# Patient Record
Sex: FEMALE | Race: ASIAN | NOT HISPANIC OR LATINO | Employment: FULL TIME | URBAN - NONMETROPOLITAN AREA
[De-identification: names, ages, dates, MRNs, and addresses within clinical notes are randomized per-mention and may not be internally consistent; named-entity substitution may affect disease eponyms.]

---

## 2018-12-14 ENCOUNTER — HOSPITAL ENCOUNTER (OUTPATIENT)
Dept: LAB | Facility: MEDICAL CENTER | Age: 28
End: 2018-12-14
Payer: COMMERCIAL

## 2018-12-14 PROCEDURE — 84439 ASSAY OF FREE THYROXINE: CPT

## 2018-12-14 PROCEDURE — 84443 ASSAY THYROID STIM HORMONE: CPT

## 2018-12-14 PROCEDURE — 83036 HEMOGLOBIN GLYCOSYLATED A1C: CPT

## 2018-12-14 PROCEDURE — 36415 COLL VENOUS BLD VENIPUNCTURE: CPT

## 2018-12-15 LAB
EST. AVERAGE GLUCOSE BLD GHB EST-MCNC: 128 MG/DL
HBA1C MFR BLD: 6.1 % (ref 0–5.6)
T4 FREE SERPL-MCNC: 0.6 NG/DL (ref 0.53–1.43)
TSH SERPL DL<=0.005 MIU/L-ACNC: 1.41 UIU/ML (ref 0.38–5.33)

## 2019-01-16 ENCOUNTER — HOSPITAL ENCOUNTER (INPATIENT)
Facility: MEDICAL CENTER | Age: 29
LOS: 3 days | End: 2019-01-19
Attending: OBSTETRICS & GYNECOLOGY | Admitting: OBSTETRICS & GYNECOLOGY
Payer: COMMERCIAL

## 2019-01-16 LAB
BASOPHILS # BLD AUTO: 0.7 % (ref 0–1.8)
BASOPHILS # BLD: 0.07 K/UL (ref 0–0.12)
CRYSTALS AMN MICRO: NORMAL
EOSINOPHIL # BLD AUTO: 0.21 K/UL (ref 0–0.51)
EOSINOPHIL NFR BLD: 2 % (ref 0–6.9)
ERYTHROCYTE [DISTWIDTH] IN BLOOD BY AUTOMATED COUNT: 45.1 FL (ref 35.9–50)
GLUCOSE BLD-MCNC: 90 MG/DL (ref 65–99)
HCT VFR BLD AUTO: 36.5 % (ref 37–47)
HGB BLD-MCNC: 12.5 G/DL (ref 12–16)
HOLDING TUBE BB 8507: NORMAL
IMM GRANULOCYTES # BLD AUTO: 0.08 K/UL (ref 0–0.11)
IMM GRANULOCYTES NFR BLD AUTO: 0.8 % (ref 0–0.9)
LYMPHOCYTES # BLD AUTO: 1.76 K/UL (ref 1–4.8)
LYMPHOCYTES NFR BLD: 16.6 % (ref 22–41)
MCH RBC QN AUTO: 31.9 PG (ref 27–33)
MCHC RBC AUTO-ENTMCNC: 34.2 G/DL (ref 33.6–35)
MCV RBC AUTO: 93.1 FL (ref 81.4–97.8)
MONOCYTES # BLD AUTO: 0.69 K/UL (ref 0–0.85)
MONOCYTES NFR BLD AUTO: 6.5 % (ref 0–13.4)
NEUTROPHILS # BLD AUTO: 7.8 K/UL (ref 2–7.15)
NEUTROPHILS NFR BLD: 73.4 % (ref 44–72)
NRBC # BLD AUTO: 0 K/UL
NRBC BLD-RTO: 0 /100 WBC
PLATELET # BLD AUTO: 326 K/UL (ref 164–446)
PMV BLD AUTO: 9.8 FL (ref 9–12.9)
RBC # BLD AUTO: 3.92 M/UL (ref 4.2–5.4)
WBC # BLD AUTO: 10.6 K/UL (ref 4.8–10.8)

## 2019-01-16 PROCEDURE — 3E033VJ INTRODUCTION OF OTHER HORMONE INTO PERIPHERAL VEIN, PERCUTANEOUS APPROACH: ICD-10-PCS | Performed by: OBSTETRICS & GYNECOLOGY

## 2019-01-16 PROCEDURE — 700111 HCHG RX REV CODE 636 W/ 250 OVERRIDE (IP): Performed by: OBSTETRICS & GYNECOLOGY

## 2019-01-16 PROCEDURE — 85025 COMPLETE CBC W/AUTO DIFF WBC: CPT

## 2019-01-16 PROCEDURE — 89060 EXAM SYNOVIAL FLUID CRYSTALS: CPT

## 2019-01-16 PROCEDURE — 82962 GLUCOSE BLOOD TEST: CPT

## 2019-01-16 PROCEDURE — 36415 COLL VENOUS BLD VENIPUNCTURE: CPT

## 2019-01-16 PROCEDURE — 770002 HCHG ROOM/CARE - OB PRIVATE (112)

## 2019-01-16 PROCEDURE — 700105 HCHG RX REV CODE 258: Performed by: OBSTETRICS & GYNECOLOGY

## 2019-01-16 RX ORDER — MISOPROSTOL 200 UG/1
800 TABLET ORAL
Status: DISCONTINUED | OUTPATIENT
Start: 2019-01-16 | End: 2019-01-17 | Stop reason: HOSPADM

## 2019-01-16 RX ORDER — SODIUM CHLORIDE, SODIUM LACTATE, POTASSIUM CHLORIDE, CALCIUM CHLORIDE 600; 310; 30; 20 MG/100ML; MG/100ML; MG/100ML; MG/100ML
INJECTION, SOLUTION INTRAVENOUS CONTINUOUS
Status: DISCONTINUED | OUTPATIENT
Start: 2019-01-16 | End: 2019-01-19 | Stop reason: HOSPADM

## 2019-01-16 RX ADMIN — SODIUM CHLORIDE, POTASSIUM CHLORIDE, SODIUM LACTATE AND CALCIUM CHLORIDE: 600; 310; 30; 20 INJECTION, SOLUTION INTRAVENOUS at 17:40

## 2019-01-16 RX ADMIN — Medication 1 MILLI-UNITS/MIN: at 17:40

## 2019-01-16 ASSESSMENT — LIFESTYLE VARIABLES
EVER_SMOKED: YES
ALCOHOL_USE: NO

## 2019-01-16 ASSESSMENT — COPD QUESTIONNAIRES
HAVE YOU SMOKED AT LEAST 100 CIGARETTES IN YOUR ENTIRE LIFE: YES
IN THE PAST 12 MONTHS DO YOU DO LESS THAN YOU USED TO BECAUSE OF YOUR BREATHING PROBLEMS: DISAGREE/UNSURE
DURING THE PAST 4 WEEKS HOW MUCH DID YOU FEEL SHORT OF BREATH: NONE/LITTLE OF THE TIME
COPD SCREENING SCORE: 2
DO YOU EVER COUGH UP ANY MUCUS OR PHLEGM?: NO/ONLY WITH OCCASIONAL COLDS OR INFECTIONS

## 2019-01-16 ASSESSMENT — PATIENT HEALTH QUESTIONNAIRE - PHQ9
SUM OF ALL RESPONSES TO PHQ9 QUESTIONS 1 AND 2: 0
1. LITTLE INTEREST OR PLEASURE IN DOING THINGS: NOT AT ALL
2. FEELING DOWN, DEPRESSED, IRRITABLE, OR HOPELESS: NOT AT ALL

## 2019-01-16 ASSESSMENT — PAIN SCALES - GENERAL
PAINLEVEL_OUTOF10: 0
PAINLEVEL_OUTOF10: 0

## 2019-01-16 NOTE — PROGRESS NOTES
"1300- 29 yr old, , EDC 19, 38.2wks, arrived to L&D for possible SROM at 1030 clr fluid per pt. Pt states, \"fluid is coming out sporadically.\"  Pt denies VB or UC's. Pt reports \"lots\" of +FM.   US and toco on, VS/S.  1345- SVE 1-2/60/-2, forebag present, no fluid present, fern obtained.  Fermarielos present.  Message left for Dr. Blue.  -Orders for pt to be admitted and started on pitocin, per Dr. Blue.  - Report given to FCO Martínez RN.  "

## 2019-01-17 LAB
ERYTHROCYTE [DISTWIDTH] IN BLOOD BY AUTOMATED COUNT: 44.7 FL (ref 35.9–50)
GLUCOSE BLD-MCNC: 88 MG/DL (ref 65–99)
HCT VFR BLD AUTO: 33.4 % (ref 37–47)
HGB BLD-MCNC: 11.3 G/DL (ref 12–16)
MCH RBC QN AUTO: 31.4 PG (ref 27–33)
MCHC RBC AUTO-ENTMCNC: 33.8 G/DL (ref 33.6–35)
MCV RBC AUTO: 92.8 FL (ref 81.4–97.8)
PLATELET # BLD AUTO: 266 K/UL (ref 164–446)
PMV BLD AUTO: 9.3 FL (ref 9–12.9)
RBC # BLD AUTO: 3.6 M/UL (ref 4.2–5.4)
WBC # BLD AUTO: 21.8 K/UL (ref 4.8–10.8)

## 2019-01-17 PROCEDURE — 85027 COMPLETE CBC AUTOMATED: CPT

## 2019-01-17 PROCEDURE — 36415 COLL VENOUS BLD VENIPUNCTURE: CPT

## 2019-01-17 PROCEDURE — 304965 HCHG RECOVERY SERVICES

## 2019-01-17 PROCEDURE — 82962 GLUCOSE BLOOD TEST: CPT

## 2019-01-17 PROCEDURE — 303615 HCHG EPIDURAL/SPINAL ANESTHESIA FOR LABOR

## 2019-01-17 PROCEDURE — A9270 NON-COVERED ITEM OR SERVICE: HCPCS | Performed by: OBSTETRICS & GYNECOLOGY

## 2019-01-17 PROCEDURE — 700111 HCHG RX REV CODE 636 W/ 250 OVERRIDE (IP)

## 2019-01-17 PROCEDURE — 59409 OBSTETRICAL CARE: CPT

## 2019-01-17 PROCEDURE — 770002 HCHG ROOM/CARE - OB PRIVATE (112)

## 2019-01-17 PROCEDURE — 700111 HCHG RX REV CODE 636 W/ 250 OVERRIDE (IP): Performed by: OBSTETRICS & GYNECOLOGY

## 2019-01-17 PROCEDURE — 700105 HCHG RX REV CODE 258: Performed by: OBSTETRICS & GYNECOLOGY

## 2019-01-17 PROCEDURE — 700102 HCHG RX REV CODE 250 W/ 637 OVERRIDE(OP): Performed by: OBSTETRICS & GYNECOLOGY

## 2019-01-17 RX ORDER — ONDANSETRON 2 MG/ML
4 INJECTION INTRAMUSCULAR; INTRAVENOUS EVERY 6 HOURS PRN
Status: DISCONTINUED | OUTPATIENT
Start: 2019-01-17 | End: 2019-01-19 | Stop reason: HOSPADM

## 2019-01-17 RX ORDER — MISOPROSTOL 200 UG/1
600 TABLET ORAL
Status: DISCONTINUED | OUTPATIENT
Start: 2019-01-17 | End: 2019-01-19 | Stop reason: HOSPADM

## 2019-01-17 RX ORDER — OXYCODONE HYDROCHLORIDE AND ACETAMINOPHEN 5; 325 MG/1; MG/1
2 TABLET ORAL EVERY 4 HOURS PRN
Status: DISCONTINUED | OUTPATIENT
Start: 2019-01-17 | End: 2019-01-17

## 2019-01-17 RX ORDER — OXYCODONE HYDROCHLORIDE AND ACETAMINOPHEN 5; 325 MG/1; MG/1
1-2 TABLET ORAL EVERY 4 HOURS PRN
Status: DISCONTINUED | OUTPATIENT
Start: 2019-01-17 | End: 2019-01-19 | Stop reason: HOSPADM

## 2019-01-17 RX ORDER — ONDANSETRON 2 MG/ML
INJECTION INTRAMUSCULAR; INTRAVENOUS
Status: COMPLETED
Start: 2019-01-17 | End: 2019-01-17

## 2019-01-17 RX ORDER — SODIUM CHLORIDE, SODIUM LACTATE, POTASSIUM CHLORIDE, CALCIUM CHLORIDE 600; 310; 30; 20 MG/100ML; MG/100ML; MG/100ML; MG/100ML
INJECTION, SOLUTION INTRAVENOUS PRN
Status: DISCONTINUED | OUTPATIENT
Start: 2019-01-17 | End: 2019-01-19 | Stop reason: HOSPADM

## 2019-01-17 RX ORDER — IBUPROFEN 600 MG/1
600 TABLET ORAL EVERY 6 HOURS PRN
Status: DISCONTINUED | OUTPATIENT
Start: 2019-01-17 | End: 2019-01-19 | Stop reason: HOSPADM

## 2019-01-17 RX ORDER — METHYLERGONOVINE MALEATE 0.2 MG/ML
0.2 INJECTION INTRAVENOUS
Status: DISCONTINUED | OUTPATIENT
Start: 2019-01-17 | End: 2019-01-17

## 2019-01-17 RX ORDER — ONDANSETRON 4 MG/1
4 TABLET, ORALLY DISINTEGRATING ORAL EVERY 6 HOURS PRN
Status: DISCONTINUED | OUTPATIENT
Start: 2019-01-17 | End: 2019-01-19 | Stop reason: HOSPADM

## 2019-01-17 RX ORDER — LIDOCAINE HYDROCHLORIDE AND EPINEPHRINE 15; 5 MG/ML; UG/ML
INJECTION, SOLUTION EPIDURAL
Status: ACTIVE
Start: 2019-01-17 | End: 2019-01-17

## 2019-01-17 RX ORDER — IBUPROFEN 600 MG/1
600 TABLET ORAL EVERY 6 HOURS PRN
Status: DISCONTINUED | OUTPATIENT
Start: 2019-01-17 | End: 2019-01-17

## 2019-01-17 RX ORDER — SODIUM CHLORIDE, SODIUM LACTATE, POTASSIUM CHLORIDE, AND CALCIUM CHLORIDE .6; .31; .03; .02 G/100ML; G/100ML; G/100ML; G/100ML
1000 INJECTION, SOLUTION INTRAVENOUS
Status: DISCONTINUED | OUTPATIENT
Start: 2019-01-17 | End: 2019-01-17 | Stop reason: HOSPADM

## 2019-01-17 RX ORDER — ROPIVACAINE HYDROCHLORIDE 2 MG/ML
INJECTION, SOLUTION EPIDURAL; INFILTRATION; PERINEURAL CONTINUOUS
Status: DISCONTINUED | OUTPATIENT
Start: 2019-01-17 | End: 2019-01-19 | Stop reason: HOSPADM

## 2019-01-17 RX ORDER — OXYCODONE HYDROCHLORIDE AND ACETAMINOPHEN 5; 325 MG/1; MG/1
1 TABLET ORAL EVERY 4 HOURS PRN
Status: DISCONTINUED | OUTPATIENT
Start: 2019-01-17 | End: 2019-01-19 | Stop reason: HOSPADM

## 2019-01-17 RX ORDER — ROPIVACAINE HYDROCHLORIDE 2 MG/ML
INJECTION, SOLUTION EPIDURAL; INFILTRATION; PERINEURAL
Status: COMPLETED
Start: 2019-01-17 | End: 2019-01-17

## 2019-01-17 RX ORDER — VITAMIN A ACETATE, BETA CAROTENE, ASCORBIC ACID, CHOLECALCIFEROL, .ALPHA.-TOCOPHEROL ACETATE, DL-, THIAMINE MONONITRATE, RIBOFLAVIN, NIACINAMIDE, PYRIDOXINE HYDROCHLORIDE, FOLIC ACID, CYANOCOBALAMIN, CALCIUM CARBONATE, FERROUS FUMARATE, ZINC OXIDE, CUPRIC OXIDE 3080; 12; 120; 400; 1; 1.84; 3; 20; 22; 920; 25; 200; 27; 10; 2 [IU]/1; UG/1; MG/1; [IU]/1; MG/1; MG/1; MG/1; MG/1; MG/1; [IU]/1; MG/1; MG/1; MG/1; MG/1; MG/1
1 TABLET, FILM COATED ORAL EVERY MORNING
Status: DISCONTINUED | OUTPATIENT
Start: 2019-01-17 | End: 2019-01-19 | Stop reason: HOSPADM

## 2019-01-17 RX ORDER — SODIUM CHLORIDE, SODIUM LACTATE, POTASSIUM CHLORIDE, AND CALCIUM CHLORIDE .6; .31; .03; .02 G/100ML; G/100ML; G/100ML; G/100ML
250 INJECTION, SOLUTION INTRAVENOUS PRN
Status: DISCONTINUED | OUTPATIENT
Start: 2019-01-17 | End: 2019-01-17 | Stop reason: HOSPADM

## 2019-01-17 RX ORDER — BUPIVACAINE HYDROCHLORIDE 2.5 MG/ML
INJECTION, SOLUTION EPIDURAL; INFILTRATION; INTRACAUDAL
Status: ACTIVE
Start: 2019-01-17 | End: 2019-01-17

## 2019-01-17 RX ORDER — DOCUSATE SODIUM 100 MG/1
100 CAPSULE, LIQUID FILLED ORAL 2 TIMES DAILY PRN
Status: DISCONTINUED | OUTPATIENT
Start: 2019-01-17 | End: 2019-01-19 | Stop reason: HOSPADM

## 2019-01-17 RX ADMIN — ROPIVACAINE HYDROCHLORIDE 100 ML: 2 INJECTION, SOLUTION EPIDURAL; INFILTRATION at 03:31

## 2019-01-17 RX ADMIN — FENTANYL CITRATE 100 MCG: 50 INJECTION, SOLUTION INTRAMUSCULAR; INTRAVENOUS at 02:54

## 2019-01-17 RX ADMIN — ONDANSETRON 4 MG: 2 INJECTION INTRAMUSCULAR; INTRAVENOUS at 13:42

## 2019-01-17 RX ADMIN — Medication 125 ML/HR: at 14:08

## 2019-01-17 RX ADMIN — SODIUM CHLORIDE, POTASSIUM CHLORIDE, SODIUM LACTATE AND CALCIUM CHLORIDE: 600; 310; 30; 20 INJECTION, SOLUTION INTRAVENOUS at 11:10

## 2019-01-17 RX ADMIN — IBUPROFEN 600 MG: 600 TABLET, FILM COATED ORAL at 17:56

## 2019-01-17 RX ADMIN — Medication 2000 ML/HR: at 12:58

## 2019-01-17 RX ADMIN — SODIUM CHLORIDE, POTASSIUM CHLORIDE, SODIUM LACTATE AND CALCIUM CHLORIDE: 600; 310; 30; 20 INJECTION, SOLUTION INTRAVENOUS at 01:42

## 2019-01-17 RX ADMIN — FENTANYL CITRATE 100 MCG: 50 INJECTION, SOLUTION INTRAMUSCULAR; INTRAVENOUS at 01:45

## 2019-01-17 RX ADMIN — FENTANYL CITRATE 100 MCG: 50 INJECTION, SOLUTION INTRAMUSCULAR; INTRAVENOUS at 00:44

## 2019-01-17 RX ADMIN — Medication 1 TABLET: at 17:56

## 2019-01-17 RX ADMIN — SODIUM CHLORIDE, POTASSIUM CHLORIDE, SODIUM LACTATE AND CALCIUM CHLORIDE: 600; 310; 30; 20 INJECTION, SOLUTION INTRAVENOUS at 02:55

## 2019-01-17 RX ADMIN — ROPIVACAINE HYDROCHLORIDE 100 ML: 2 INJECTION, SOLUTION EPIDURAL; INFILTRATION; PERINEURAL at 03:31

## 2019-01-17 ASSESSMENT — PAIN SCALES - GENERAL
PAINLEVEL_OUTOF10: 0
PAINLEVEL_OUTOF10: 3
PAINLEVEL_OUTOF10: 1
PAINLEVEL_OUTOF10: 0

## 2019-01-17 NOTE — L&D DELIVERY NOTE
Delivery note    2019    Delivering physician: Dr. Brasher  Attending physician: Dr. Blue    Diagnoses:  1-term intrauterine pregnancy at 38-3/7 weeks  2- viable male Apgars 2/9      Procedure: Spontaneous vaginal delivery    Hospital course: Patient is a 29-year-old female  2 para 1 who presented on  at 38-2/7 weeks with spontaneous rupture of membranes.  She did not go in labor so she is begun on Pitocin.  She made slow progress.  When I checked her this morning at around 7:15 AM she was 7 cm.  She reached completion at around 12:30 PM.  She pushed over several contractions delivering a viable male over an intact perineum in OA presentation.  Baby delivered without complication, baby had good tone but refused to breathe so the cord was clamped and cut.  Baby was given to nurse respiratory was called and then baby rapidly perked up.    Placenta delivered spontaneously intact.  Exam of intrauterine cavity revealed no retained placenta.  Estimated blood loss was less than 100 cc.  There were no tears.

## 2019-01-17 NOTE — PROGRESS NOTES
1900-Report received from JOHANNY Hill RN. Pt resting comfortably in bed. POC discussed, questions answered. FOB at bedside. Pt denies needs at this time.   2013-Dr. Working at bedside, SVE 2/80/-2, IUPC placed  2025-FSBS 90  0000-SVE 3/80/-2  0320-Dr. Sanders at bedside, epidural placed  0515-SVE 6/90/-2  0700-Report given to Brynn INIGUEZ

## 2019-01-17 NOTE — H&P
DATE OF ADMISSION:  01/16/2019    IDENTIFICATION:  This is a 29-year-old G2, P1, EDC 01/28/2019.  She makes her   38 weeks and 2 days who is being admitted with ruptured membranes.    HISTORY OF PRESENT ILLNESS:  Patient has been followed by myself.  Pregnancy   was overall uncomplicated.  She does have gestational diabetes, this has been   well controlled on diet alone.  She was followed by the high risk pregnancy   center.  She just had an ultrasound 2 days ago which showed baby to be at the   50th percentile, weighing 7 pounds 2 ounces and vertex presentation.  Her   group B strep was negative.  She had a lapse in care from 32-37 weeks that she   went to [place] to visit her family.    Patient presented today complaining of leaking of fluid since 10:30 a.m.    Ruptured membranes was confirmed and she is feeling some irregular   contractions.    OBSTETRICAL HISTORY:  Previous vaginal delivery in 2009 at 37 weeks, baby   weighed 6 pounds 7 ounces.    GYNECOLOGIC HISTORY:  She has no history of abnormal Pap smears or STDs.    PAST MEDICAL HISTORY:  None.    PAST SURGICAL HISTORY:  None.    ALLERGIES:  She has no known drug allergies.    SOCIAL HISTORY:  She is .  She has been working for [employer].  She denies   use of tobacco, alcohol or drugs.    FAMILY HISTORY:  Significant for diabetes and high blood pressure in her   maternal grandfather.    PHYSICAL EXAMINATION:  VITAL SIGNS:  She is afebrile.  Blood pressures are within normal limits.  GENERAL:  She is overall comfortable, and cooperative.  ABDOMEN:  Soft.  Leopold's 7 pounds.  GENITOURINARY:  Sterile vaginal exam per the nurse is 1-2, 60% effaced, -2   station.  External fetal monitoring is category 1.  Tocometry reveals   contractions irregularly.    ASSESSMENT:  A 29-year-old G2, P1 at 38 weeks and 2 days, here with ruptured   membranes and group B streptococcus negative status, gestational diabetes that   has been well controlled.    PLAN:  Will be  to start Pitocin to get labor started.  She can have an   epidural, she desires and we will follow her and expect a vaginal delivery.    Dr. Kothari is aware and will take over for me this evening.       ____________________________________     MD DESTIN Cervantes / MARGOTH    DD:  01/16/2019 17:04:32  DT:  01/16/2019 17:37:17    D#:  [id number]  Job#:  [id number]

## 2019-01-17 NOTE — CARE PLAN
Problem: Safety  Goal: Will remain free from injury  Outcome: PROGRESSING AS EXPECTED  Pt family encouraged to report any spills, loose cords. IUPC and EFM are on pt to monitor baby. Pt understands to not get out of bed with Epidural in place. IV fluids running as ordered. Educated on use of call light. Call light available at bedside.     Problem: Infection  Goal: Will remain free from infection  Outcome: PROGRESSING AS EXPECTED  Pt has not shown any s/s of infection duration of shift. Pt has remained afebrile. Will continue to observe for s/s of infection.

## 2019-01-17 NOTE — PROGRESS NOTES
0700- Report received from Vanessa MILLER RN. POC discussed. Assumed care of pt at this time. VSS. Pt coping through pain.    0730- Dr. Brasher at bedside. POC discussed with pt. SVE . FS 88.  0820- Pt c/o feeling sudden pressure. SVE by RN . Pt educated to call out when feeling increase in pressure. Will continue to monitor.   0850- Dr. Brasher telephoned and updated on pt status. Strip reviewed by MD. Orders received to check pt, if not completely dilated to start amnioinfusion at 300ml Bolus of LR.   09- SVE by RN . Amnioinfusion started. Pt and FOB Eduary educated.   1230- Dr. Brasher at bedside for status update. SVE at this time. Pt is completely dilated. Instructions given to have pt in stirrups. FOB at bedside. Given instructions on pushing technique.   1255-  of viable male infant APGARS 2/9. Rapid response called on infant. See Delivery record.   1500- Pt up to restroom. Ambulated with steady gait. Voided. Pericare done at this time.   1540- Pt transferred to postpartum unit via wheelchair with infant in arms. Report given to Any valentino. POC discussed.

## 2019-01-17 NOTE — CARE PLAN
Problem: Pain  Goal: Alleviation of Pain or a reduction in pain to the patient's comfort goal  Outcome: PROGRESSING AS EXPECTED  Pt is comfortable and has no complaints at this time.    Problem: Risk for Infection, Impaired Wound Healing  Goal: Remain free from signs and symptoms of infection    Intervention: Limit vaginal exams as necessary  Vaginal exams limited as necessary to prevent infection.

## 2019-01-17 NOTE — PROGRESS NOTES
S: Doing well. Just had fentanyl      O:   Gen: NAD   SVE: 3cm per RN  [place]: CTX q3  FHT: 140 with moderate LTV. +accels. No variables/decels     A/P 30yo  @ 38w, PROM  1. Labor: remains latent. Continue pitocin  2. Cat I  3. Pain controlled. Plans epidural  4. GDMA1: will obtain random CBG

## 2019-01-17 NOTE — PROGRESS NOTES
"S: now comfy with epidural. Just tired.    O: Blood pressure 128/74, pulse 95, temperature 37.2 °C (98.9 °F), temperature source Temporal, resp. rate 16, height 1.6 m (5' 3\"), weight 79.4 kg (175 lb), SpO2 94 %.  Gen: NAD  SVE: per RN @ 0515 6cm   IUPC: CTX q3  FHT: 130 with moderate LTV. +accles. Occ varialbes    A/P 28yo  @ 38w, PROM  1. Labor: now active. Continue pitocin  2. Cat II  3. Pain controlled  4. GBS neg   "

## 2019-01-17 NOTE — CARE PLAN
Problem: Pain  Goal: Alleviation of Pain or a reduction in pain to the patient's comfort goal  Outcome: PROGRESSING AS EXPECTED  Pain POC discussed, pt denies pain at this time, will call out when pain medication intervention is necessary    Problem: Risk for Infection, Impaired Wound Healing  Goal: Remain free from signs and symptoms of infection  Outcome: PROGRESSING AS EXPECTED  Pt afebrile, no s/s of infection

## 2019-01-17 NOTE — PROGRESS NOTES
"S: doing well. Feeling some ctx. Planning an epidural    O: Blood pressure 120/76, pulse 74, temperature 37.2 °C (98.9 °F), temperature source Temporal, resp. rate 16, height 1.6 m (5' 3\"), weight 79.4 kg (175 lb).  Gen: NAD   SVE: 2//-3  IUPC placed  [place]: CTX q5  FHT: 140 with moderate LTV. +accels. No variables/decels    A/P 30yo  @ 38w, PROM  1. Labor: remains latent. Continue pitocin  2. Cat I  3. Pain controlled. Plans epidural  4. GDMA1: will obtain random CBG  "

## 2019-01-18 PROCEDURE — 700102 HCHG RX REV CODE 250 W/ 637 OVERRIDE(OP): Performed by: OBSTETRICS & GYNECOLOGY

## 2019-01-18 PROCEDURE — A9270 NON-COVERED ITEM OR SERVICE: HCPCS | Performed by: OBSTETRICS & GYNECOLOGY

## 2019-01-18 PROCEDURE — 770002 HCHG ROOM/CARE - OB PRIVATE (112)

## 2019-01-18 RX ADMIN — IBUPROFEN 600 MG: 600 TABLET, FILM COATED ORAL at 06:36

## 2019-01-18 RX ADMIN — IBUPROFEN 600 MG: 600 TABLET, FILM COATED ORAL at 19:19

## 2019-01-18 RX ADMIN — OXYCODONE AND ACETAMINOPHEN 2 TABLET: 5; 325 TABLET ORAL at 09:31

## 2019-01-18 RX ADMIN — OXYCODONE AND ACETAMINOPHEN 1 TABLET: 5; 325 TABLET ORAL at 19:19

## 2019-01-18 ASSESSMENT — PAIN SCALES - GENERAL
PAINLEVEL_OUTOF10: 2
PAINLEVEL_OUTOF10: 4
PAINLEVEL_OUTOF10: 2
PAINLEVEL_OUTOF10: 5

## 2019-01-18 NOTE — PROGRESS NOTES
1827- Dr. Mercado on the postpartum unit and notified of patient's blood pressure of 144/77 and notified that patient's fundus is firm and lochia is light.  Verbal order received to discontinue methergine.

## 2019-01-18 NOTE — CARE PLAN
Problem: Altered physiologic condition related to immediate post-delivery state and potential for bleeding/hemorrhage  Goal: Patient physiologically stable as evidenced by normal lochia, palpable uterine involution and vital signs within normal limits  Outcome: PROGRESSING SLOWER THAN EXPECTED  Fundus firm @ U, lochia rubra minimal. V/S stable @ this time.     Problem: Alteration in comfort related to episiotomy, vaginal repair and/or after birth pains  Goal: Patient verbalizes acceptable pain level  Outcome: PROGRESSING AS EXPECTED  Pain controlled @ this time. Will be medicated as needed.

## 2019-01-18 NOTE — LACTATION NOTE
Mother states she has been using personal Medela pump since baby is in NICU, states she exclusively pumped with personal pump for older child, encouraged to use HG pump for adequate breast stimulation, encouraged to pump Q 2-3 hours at least 8 times every 24 hours, [facility] and [facility] to  [place], encouraged to call for assistance as needed.

## 2019-01-18 NOTE — PROGRESS NOTES
Progress Note        Subjective: Patient is doing well lochia is normal.    Objective: Vital signs are normal  General: Patient's awake alert pleasant  Head: Atraumatic normocephalic  Abdomen: Fundus is firm nontender    Assessment:  Postpartum day 1 vaginal delivery    Plan:  DC tomorrow, routine care    Rhina Brasher MD

## 2019-01-18 NOTE — CARE PLAN
Problem: Communication  Goal: The ability to communicate needs accurately and effectively will improve  Outcome: PROGRESSING AS EXPECTED  Reviewed plan of care with patient who verbalized understanding.    Problem: Altered physiologic condition related to immediate post-delivery state and potential for bleeding/hemorrhage  Goal: Patient physiologically stable as evidenced by normal lochia, palpable uterine involution and vital signs within normal limits  Outcome: PROGRESSING AS EXPECTED  Fundus firm.  Lochia light.

## 2019-01-18 NOTE — CONSULTS
This LC came in to talk to MOB- she brought her own pump, and she states she will pump when she is ready. Declines help at this time.

## 2019-01-18 NOTE — PROGRESS NOTES
1900- bedside report done. Patient in bed socializing with visitors. Denies pain @ This time.   2030- Patient will use her own breast pump. Encourage to pump every 3 hrs., and patient verbalized understanding.

## 2019-01-18 NOTE — PROGRESS NOTES
1555- Patient arrived to Room S319.  Report received from HIRA Simeon.  Fundus firm.  Lochia light.  IV patent.  Patient up to bathroom and voided without difficulty.  Patient oriented to location of call light and emergency cord.  Patient denied need for pain medication at this time.  1630- Patient assessment done.  Discussed pain management with patient. Patient prefers to be call for pain medication as needed.  Patient oriented to room, call system, and infant security.  Reviewed plan of care.

## 2019-01-19 VITALS
HEIGHT: 63 IN | BODY MASS INDEX: 31.01 KG/M2 | WEIGHT: 175 LBS | HEART RATE: 73 BPM | TEMPERATURE: 98.3 F | RESPIRATION RATE: 18 BRPM | SYSTOLIC BLOOD PRESSURE: 122 MMHG | OXYGEN SATURATION: 99 % | DIASTOLIC BLOOD PRESSURE: 81 MMHG

## 2019-01-19 PROCEDURE — A9270 NON-COVERED ITEM OR SERVICE: HCPCS | Performed by: OBSTETRICS & GYNECOLOGY

## 2019-01-19 PROCEDURE — 700102 HCHG RX REV CODE 250 W/ 637 OVERRIDE(OP): Performed by: OBSTETRICS & GYNECOLOGY

## 2019-01-19 RX ORDER — IBUPROFEN 600 MG/1
600 TABLET ORAL EVERY 6 HOURS PRN
Qty: 30 TAB | Refills: 1 | Status: SHIPPED | OUTPATIENT
Start: 2019-01-19 | End: 2021-09-03

## 2019-01-19 RX ADMIN — Medication 1 TABLET: at 09:12

## 2019-01-19 RX ADMIN — OXYCODONE AND ACETAMINOPHEN 1 TABLET: 5; 325 TABLET ORAL at 05:14

## 2019-01-19 RX ADMIN — IBUPROFEN 600 MG: 600 TABLET, FILM COATED ORAL at 05:14

## 2019-01-19 RX ADMIN — IBUPROFEN 600 MG: 600 TABLET, FILM COATED ORAL at 12:48

## 2019-01-19 ASSESSMENT — EDINBURGH POSTNATAL DEPRESSION SCALE (EPDS)
I HAVE FELT SCARED OR PANICKY FOR NO GOOD REASON: NO, NOT AT ALL
I HAVE BEEN ABLE TO LAUGH AND SEE THE FUNNY SIDE OF THINGS: AS MUCH AS I ALWAYS COULD
I HAVE BEEN SO UNHAPPY THAT I HAVE BEEN CRYING: NO, NEVER
I HAVE BEEN ANXIOUS OR WORRIED FOR NO GOOD REASON: NO, NOT AT ALL
THINGS HAVE BEEN GETTING ON TOP OF ME: NO, I HAVE BEEN COPING AS WELL AS EVER
I HAVE BLAMED MYSELF UNNECESSARILY WHEN THINGS WENT WRONG: NO, NEVER
I HAVE LOOKED FORWARD WITH ENJOYMENT TO THINGS: AS MUCH AS I EVER DID
I HAVE FELT SAD OR MISERABLE: NO, NOT AT ALL
THE THOUGHT OF HARMING MYSELF HAS OCCURRED TO ME: NEVER
I HAVE BEEN SO UNHAPPY THAT I HAVE HAD DIFFICULTY SLEEPING: NOT AT ALL

## 2019-01-19 ASSESSMENT — PAIN SCALES - GENERAL
PAINLEVEL_OUTOF10: 0
PAINLEVEL_OUTOF10: 0
PAINLEVEL_OUTOF10: 3
PAINLEVEL_OUTOF10: 3
PAINLEVEL_OUTOF10: 5

## 2019-01-19 NOTE — CARE PLAN
Problem: Altered physiologic condition related to immediate post-delivery state and potential for bleeding/hemorrhage  Goal: Patient physiologically stable as evidenced by normal lochia, palpable uterine involution and vital signs within normal limits  Fundus firm, lochia     Problem: Potential for postpartum infection related to presence of episiotomy/vaginal tear and/or uterine contamination  Goal: Patient will be absent from signs and symptoms of infection  No s/s of infection

## 2019-01-19 NOTE — DISCHARGE INSTRUCTIONS
POSTPARTUM DISCHARGE INSTRUCTIONS FOR MOM    YOB: 1990   Age: 29 y.o.               Admit Date: 2019     Discharge Date: 2019  Attending Doctor:  Saranya Blue M.D.                  Allergies:  Patient has no known allergies.    Discharged to home by car. Discharged via wheelchair, hospital escort: Yes.  Special equipment needed: Not Applicable  Belongings with: Personal  Be sure to schedule a follow-up appointment with your primary care doctor or any specialists as instructed.     Discharge Plan:   Diet Plan: Discussed  Activity Level: Discussed  Smoking Cessation Offered: Patient Counseled  Confirmed Follow up Appointment: Patient to Call and Schedule Appointment  Confirmed Symptoms Management: Discussed  Medication Reconciliation Updated: Yes  Influenza Vaccine Indication: Patient Refuses    REASONS TO CALL YOUR OBSTETRICIAN:  1.   Persistent fever or shaking chills (Temperature higher than 100.4)  2.   Heavy bleeding (soaking more than 1 pad per hour); Passing clots  3.   Foul odor from vagina  4.   Mastitis (Breast infection; breast pain, chills, fever, redness)  5.   Urinary pain, burning or frequency  6.   Episiotomy infection  7.   Abdominal incision infection  8.   Severe depression longer than 24 hours    HAND WASHING  · Prior to handling the baby.  · Before breastfeeding or bottle feeding baby.  · After using the bathroom or changing the baby's diaper.    WOUND CARE  Ask your physician for additional care instructions.  In general:    ·  Incision:      · Keep clean and dry.    · Do NOT lift anything heavier than your baby for up to 6 weeks.    · There should not be any opening or pus.      VAGINAL CARE  · Nothing inside vagina for 6 weeks: no sexual intercourse, tampons or douching.  · Bleeding may continue for 2-4 weeks.  Amount may vary.    · Call your physician for heavy bleeding which means soaking more than 1 pad per hour    BIRTH CONTROL  · It is possible to become  "pregnant at any time after delivery and while breastfeeding.  · Plan to discuss a method of birth control with your physician at your follow up visit. visit.    DIET AND ELIMINATION  · Eating more fiber (bran cereal, fruits, and vegetables) and drinking plenty of fluids will help to avoid constipation.  · Urinary frequency after childbirth is normal.    POSTPARTUM BLUES  During the first few days after birth, you may experience a sense of the \"blues\" which may include impatience, irritability or even crying.  These feeling come and go quickly.  However, as many as 1 in 10 women experience emotional symptoms known as postpartum depression.    Postpartum depression:  May start as early as the second or third day after delivery or take several weeks or months to develop.  Symptoms of \"blues\" are present, but are more intense:  Crying spells; loss of appetite; feelings of hopelessness or loss of control; fear of touching the baby; over concern or no concern at all about the baby; little or no concern about your own appearance/caring for yourself; and/or inability to sleep or excessive sleeping.  Contact your physician if you are experiencing any of these symptoms.    Crisis Hotline:  · [place] Crisis Hotline:  4-667-UYJUGPO  Or 1-479.332.3355  · [place] Crisis Hotline:  1-219.922.4434  Or 733-813-1310    PREVENTING SHAKEN BABY:  If you are angry or stressed, PUT THE BABY IN THE CRIB, step away, take some deep breaths, and wait until you are calm to care for the baby.  DO NOT SHAKE THE BABY.  You are not alone, call a supporter for help.    · Crisis Call Center 24/7 crisis line 080-214-8498 or 1-571.717.3730  · You can also text them, text \"ANSWER\" to 415075    QUIT SMOKING/TOBACCO USE:  I understand the use of any tobacco products increases my chance of suffering from future heart disease and could cause other illnesses which may shorten my life. Quitting the use of tobacco products is the single most important thing I " can do to improve my health. For further information on smoking / tobacco cessation call a Toll Free Quit Line at 1-373.548.7240 (*National Cancer [place]) or 1-807.312.5865 (American Lung Association) or you can access the web based program at www.lungusa.org.    · [place] Tobacco Users Help Line:  (457) 730-6136       Toll Free: 1-826.977.5292  · Quit Tobacco Program [facility] (661)558-9375    DEPRESSION / SUICIDE RISK:  As you are discharged from this [facility], it is important to learn how to keep safe from harming yourself.    Recognize the warning signs:  · Abrupt changes in personality, positive or negative- including increase in energy   · Giving away possessions  · Change in eating patterns- significant weight changes-  positive or negative  · Change in sleeping patterns- unable to sleep or sleeping all the time   · Unwillingness or inability to communicate  · Depression  · Unusual sadness, discouragement and loneliness  · Talk of wanting to die  · Neglect of personal appearance   · Rebelliousness- reckless behavior  · Withdrawal from people/activities they love  · Confusion- inability to concentrate     If you or a loved one observes any of these behaviors or has concerns about self-harm, here's what you can do:  · Talk about it- your feelings and reasons for harming yourself  · Remove any means that you might use to hurt yourself (examples: pills, rope, extension cords, firearm)  · Get professional help from the community (Mental Health, Substance Abuse, psychological counseling)  · Do not be alone:Call your Safe Contact- someone whom you trust who will be there for you.  · Call your local CRISIS HOTLINE 652-0884 or 054-359-2180  · Call your local Children's Mobile Crisis Response Team [place] (605) 916-6168 or www.Thinkature  · Call the toll free National Suicide Prevention Hotlines   · National Suicide Prevention Lifeline 079-413-BQLU (7127)  · National  [facility] 800-SUICIDE (581-5673)    DISCHARGE SURVEY:  Thank you for choosing [facility].  We hope we provided you with very good care.  You may be receiving a survey in the mail.  Please fill it out.  Your opinion is valuable to us.    ADDITIONAL EDUCATIONAL MATERIALS GIVEN TO PATIENT:        My signature on this form indicates that:  1.  I have reviewed and understand the above information  2.  My questions regarding this information have been answered to my satisfaction.  3.  I have formulated a plan with my discharge nurse to obtain my prescribed medication for home.

## 2019-01-19 NOTE — DISCHARGE PLANNING
Discharge Planning Assessment Post Partum    Reason for Referral: NICU  Address: [address] [place] [place] [address]  Type of Living Situation: House with FOB and other child  Mom Diagnosis: Pregnancy  Baby Diagnosis: NICU  Primary Language: English    Name of Baby: Luis  Mother of the Baby: Kristen Garg ([phone])  Father of the Baby: Hector Catherine  Involved in baby’s care? Yes  Contact Information: [phone]    Prenatal Care: Yes  Mom's PCP: None  PCP for new baby:Pediatrician list given to MOB    Support System: Yes  Coping/Bonding between mother & baby: Yes  Source of Feeding: Breast  Supplies for Infant: Prepared    Mom's Insurance: [employer]  Baby Covered on Insurance: FOB is working on adding baby to his insurance ([facility]  Mother Employed/School: MOB and FOB work for [employer].  Other children in the home/names & ages: Hozay-9    Financial Hardship/Income: No  Mom's Mental status: Alert and Oriented x 4  Services used prior to admit: None    CPS History: No  Psychiatric History: No  Domestic Violence History: No  Drug/ETOH History: No    Resources Provided: Children and Family Resource List, Pediatrician List, NICU Bootcamp Information, [employer] Information   Referrals Made: LSW informed MOB/FOB about the [employer]. MOB/FOB stated they do not want to utilize the house at this time. LSW informed them that they can contact LSW if they would like to in the future.      Clearance for Discharge: Baby is clear to discharge home with MOB/FOB upon medical clearance.     Ongoing Plan: Continue to provide support and resources to MOB/FOB until discharge.

## 2019-01-19 NOTE — PROGRESS NOTES
Progress Note    Kristen Garg   PP day 2  Chief Admitting Dx: Pregnancy  Indication for care in labor or delivery  Delivery Type: vaginal, spontaneous      Subjective:  Ambulating: voiding, chava diet, normal lochia, pain controlled. Baby still in nicu.  Vitals:    01/17/19 2000 01/18/19 0800 01/18/19 2015 01/19/19 0800   BP: 125/75 122/79 140/80 122/81   Pulse: 84 82 77 73   Resp: 18 18 19 18   Temp: 36.8 °C (98.3 °F) 36.6 °C (97.9 °F) 36.8 °C (98.2 °F) 36.8 °C (98.3 °F)   TempSrc: Temporal Temporal Temporal Temporal   SpO2: 90% 97% 99% 99%   Weight:       Height:           Exam:  Gen: no acute distress  Abdomen: soft nt/nd firm fundus  Ext: no calf pain niki LE    Labs:   No results found for this or any previous visit (from the past 24 hour(s)).    Assessment:  Ppd 2    Plan:  Discharge home  Encourage ambulation  Pelvic rest, no heavy lifting/pulling /pushing  F/u in my office  6 weeks postpartum  Continue prenatal vitamins daily  Give Rhogam if Rh negative and indicated  Give MMR if indcated prior to discharge  Encourage breastfeeding      Cathleen Mercado M.D.

## 2019-01-19 NOTE — DISCHARGE SUMMARY
DATE OF ADMISSION:  01/16/2019    DATE OF DISCHARGE:  01/19/2019    PRINCIPAL DIAGNOSES:  1.  Intrauterine pregnancy at 30 weeks gestation with prolonged rupture of   membranes.  2.  A1 diabetic.    PROCEDURE:  Normal spontaneous vaginal delivery.    HOSPITAL COURSE:  Patient arrived at 38 weeks with rupture of membranes.  She   is well controlled with her diabetes with diet alone.  She was started on   Pitocin.  Group B strep was negative.  She ended up having a vaginal delivery.    Dr. Brasher delivered her.  She delivered a male infant.  Baby had required   some resuscitation and went to the NICU.  Apgars given were 2 and 9 and weight   was 3120 g.  By postpartum day #2, she is ambulating, voiding, and tolerating   regular diet.  Pain is controlled with p.o. pain medication.  She will be   discharged home.  The baby remains in the NICU, but is stable.  She will   follow up with Dr. Blue in 6 weeks, sooner if needed.  Verbal instructions   given and all questions answered.  Prescriptions for Motrin written.  She   will resume prenatal vitamins.  Her postoperative hematocrit was 33.4.  Again,   she is to follow up with Dr. Blue in 6 weeks.       ____________________________________     MD MEERA DAVIS / MARGOTH    DD:  01/19/2019 08:45:57  DT:  01/19/2019 09:00:50    D#:  [id number]  Job#:  [id number]

## 2019-01-19 NOTE — PROGRESS NOTES
Discharge instruction discussed. Prescription given and explained. Questions and concerns have been answered.

## 2021-01-28 ENCOUNTER — OFFICE VISIT (OUTPATIENT)
Dept: URGENT CARE | Facility: PHYSICIAN GROUP | Age: 31
End: 2021-01-28
Payer: COMMERCIAL

## 2021-01-28 VITALS
WEIGHT: 147.2 LBS | HEIGHT: 63 IN | BODY MASS INDEX: 26.08 KG/M2 | SYSTOLIC BLOOD PRESSURE: 122 MMHG | HEART RATE: 85 BPM | RESPIRATION RATE: 16 BRPM | OXYGEN SATURATION: 98 % | TEMPERATURE: 98.5 F | DIASTOLIC BLOOD PRESSURE: 86 MMHG

## 2021-01-28 DIAGNOSIS — R19.7 DIARRHEA, UNSPECIFIED TYPE: ICD-10-CM

## 2021-01-28 PROCEDURE — 99202 OFFICE O/P NEW SF 15 MIN: CPT | Performed by: PHYSICIAN ASSISTANT

## 2021-01-28 ASSESSMENT — ENCOUNTER SYMPTOMS
BLOOD IN STOOL: 0
FEVER: 0
ABDOMINAL PAIN: 0
VOMITING: 0
CHILLS: 0
SHORTNESS OF BREATH: 0
NAUSEA: 0
DIARRHEA: 1

## 2021-01-28 NOTE — LETTER
January 28, 2021         Patient: Kristen Garg   YOB: 1990   Date of Visit: 1/28/2021           To Whom it May Concern:    Kristen Garg was seen in my clinic on 1/28/2021. Please excuse from work 01/22/21.    If you have any questions or concerns, please don't hesitate to call.        Sincerely,           Darnell Ulloa P.A.-C.  Electronically Signed

## 2021-09-03 ENCOUNTER — OFFICE VISIT (OUTPATIENT)
Dept: URGENT CARE | Facility: PHYSICIAN GROUP | Age: 31
End: 2021-09-03
Payer: COMMERCIAL

## 2021-09-03 ENCOUNTER — HOSPITAL ENCOUNTER (OUTPATIENT)
Facility: MEDICAL CENTER | Age: 31
End: 2021-09-03
Attending: PHYSICIAN ASSISTANT
Payer: COMMERCIAL

## 2021-09-03 VITALS
TEMPERATURE: 98.2 F | BODY MASS INDEX: 26.46 KG/M2 | SYSTOLIC BLOOD PRESSURE: 132 MMHG | WEIGHT: 155 LBS | HEIGHT: 64 IN | HEART RATE: 88 BPM | RESPIRATION RATE: 16 BRPM | DIASTOLIC BLOOD PRESSURE: 68 MMHG | OXYGEN SATURATION: 97 %

## 2021-09-03 DIAGNOSIS — R68.89 FLU-LIKE SYMPTOMS: ICD-10-CM

## 2021-09-03 PROCEDURE — U0003 INFECTIOUS AGENT DETECTION BY NUCLEIC ACID (DNA OR RNA); SEVERE ACUTE RESPIRATORY SYNDROME CORONAVIRUS 2 (SARS-COV-2) (CORONAVIRUS DISEASE [COVID-19]), AMPLIFIED PROBE TECHNIQUE, MAKING USE OF HIGH THROUGHPUT TECHNOLOGIES AS DESCRIBED BY CMS-2020-01-R: HCPCS

## 2021-09-03 PROCEDURE — U0005 INFEC AGEN DETEC AMPLI PROBE: HCPCS

## 2021-09-03 PROCEDURE — 99213 OFFICE O/P EST LOW 20 MIN: CPT | Mod: CS | Performed by: PHYSICIAN ASSISTANT

## 2021-09-03 RX ORDER — PHENAZOPYRIDINE HYDROCHLORIDE 100 MG/1
TABLET, FILM COATED ORAL
COMMUNITY
End: 2021-09-03

## 2021-09-03 NOTE — PROGRESS NOTES
"Chief Complaint   Patient presents with   • Coronavirus Screening     sx-nasla congestion, loss of voice, chest congestion       HISTORY OF PRESENT ILLNESS: Patient is a 31 y.o. female who presents today for the following:    Cough x 2-3 days  + nasal congestion, loss of voice  History of COVID infection: no  Known COVID contact: no   COVID vaccine: no  6 months pregnant    There are no problems to display for this patient.      Allergies:Patient has no known allergies.    No current [facility]-ordered outpatient medications on file.     No current Epic-ordered facility-administered medications on file.       History reviewed. No pertinent past medical history.    Social History     Tobacco Use   • Smoking status: Former Smoker     Packs/day: 0.50     Years: 5.00     Pack years: 2.50     Types: Cigarettes     Quit date: 4/16/2018     Years since quitting: 3.3   • Smokeless tobacco: Never Used   Substance Use Topics   • Alcohol use: No   • Drug use: No       Family Status   Relation Name Status   • PGMo  (Not Specified)   • PGFa  (Not Specified)     Family History   Problem Relation Age of Onset   • Diabetes Paternal Grandmother    • Cancer Paternal Grandfather        Review of Systems:   Pertinent systems reviewed with the patient.    Exam:  /68   Pulse 88   Temp 36.8 °C (98.2 °F)   Resp 16   Ht 1.626 m (5' 4\")   Wt 70.3 kg (155 lb)   SpO2 97%   General: Well developed, well nourished. No distress.    Eye: PERRL/EOMI; conjunctivae clear, lids normal.  ENMT: Lips without lesions, MMM. Oropharynx is clear. Bilateral TMs are within normal limits.  Pulmonary: Unlabored respiratory effort. Lungs clear to auscultation, no wheezes, no rhonchi.    Cardiovascular: Regular rate and rhythm without murmur.   Neurologic: Grossly nonfocal. No facial asymmetry noted.  Lymph: No cervical lymphadenopathy noted.  Skin: Warm, dry, good turgor. No rashes in visible areas.   Psych: Normal mood. Alert and oriented to person, place " and time.    Assessment/Plan:  Discussed likely viral etiology.  Vitals and exam are unremarkable.  Low suspicion for pneumonia. Patient will be tested for COVID and has been advised to quarantine until results are available. Discussed appropriate over-the-counter symptomatic medication, and when to return to clinic. Follow up for worsening or persistent symptoms.  1. Flu-like symptoms  SARS-CoV-2, PCR (In-House): Collect NP OR nasal swab in [facility]

## 2021-09-03 NOTE — LETTER
September 3, 2021         Patient: Kristen Garg   YOB: 1990   Date of Visit: 9/3/2021           To Whom it May Concern:    Kristen Garg was seen in my clinic on 9/3/2021.     Please excuse patient for missing school/work until COVID results are available, typically taking 1-3 days.  They have been advised to quarantine during this time.      If you have any questions or concerns, please don't hesitate to call.        Sincerely,           Chasidy Rick P.A.-C.  Electronically Signed

## 2021-09-04 LAB
SARS-COV-2 RNA RESP QL NAA+PROBE: NOTDETECTED
SPECIMEN SOURCE: NORMAL

## 2021-12-14 ENCOUNTER — HOSPITAL ENCOUNTER (INPATIENT)
Facility: MEDICAL CENTER | Age: 31
LOS: 2 days | End: 2021-12-16
Attending: OBSTETRICS & GYNECOLOGY | Admitting: OBSTETRICS & GYNECOLOGY
Payer: COMMERCIAL

## 2021-12-14 LAB
BASOPHILS # BLD AUTO: 0.7 % (ref 0–1.8)
BASOPHILS # BLD: 0.14 K/UL (ref 0–0.12)
EOSINOPHIL # BLD AUTO: 0.11 K/UL (ref 0–0.51)
EOSINOPHIL NFR BLD: 0.5 % (ref 0–6.9)
ERYTHROCYTE [DISTWIDTH] IN BLOOD BY AUTOMATED COUNT: 43.8 FL (ref 35.9–50)
ERYTHROCYTE [DISTWIDTH] IN BLOOD BY AUTOMATED COUNT: 45.1 FL (ref 35.9–50)
HCT VFR BLD AUTO: 33.2 % (ref 37–47)
HCT VFR BLD AUTO: 40 % (ref 37–47)
HGB BLD-MCNC: 11.2 G/DL (ref 12–16)
HGB BLD-MCNC: 13.3 G/DL (ref 12–16)
HOLDING TUBE BB 8507: NORMAL
IMM GRANULOCYTES # BLD AUTO: 0.2 K/UL (ref 0–0.11)
IMM GRANULOCYTES NFR BLD AUTO: 1 % (ref 0–0.9)
LYMPHOCYTES # BLD AUTO: 1.92 K/UL (ref 1–4.8)
LYMPHOCYTES NFR BLD: 9.2 % (ref 22–41)
MCH RBC QN AUTO: 31.1 PG (ref 27–33)
MCH RBC QN AUTO: 31.3 PG (ref 27–33)
MCHC RBC AUTO-ENTMCNC: 33.3 G/DL (ref 33.6–35)
MCHC RBC AUTO-ENTMCNC: 33.7 G/DL (ref 33.6–35)
MCV RBC AUTO: 92.2 FL (ref 81.4–97.8)
MCV RBC AUTO: 94.1 FL (ref 81.4–97.8)
MONOCYTES # BLD AUTO: 0.96 K/UL (ref 0–0.85)
MONOCYTES NFR BLD AUTO: 4.6 % (ref 0–13.4)
NEUTROPHILS # BLD AUTO: 17.56 K/UL (ref 2–7.15)
NEUTROPHILS NFR BLD: 84 % (ref 44–72)
NRBC # BLD AUTO: 0 K/UL
NRBC BLD-RTO: 0 /100 WBC
PLATELET # BLD AUTO: 347 K/UL (ref 164–446)
PLATELET # BLD AUTO: 414 K/UL (ref 164–446)
PMV BLD AUTO: 9.2 FL (ref 9–12.9)
PMV BLD AUTO: 9.4 FL (ref 9–12.9)
RBC # BLD AUTO: 3.6 M/UL (ref 4.2–5.4)
RBC # BLD AUTO: 4.25 M/UL (ref 4.2–5.4)
WBC # BLD AUTO: 19.7 K/UL (ref 4.8–10.8)
WBC # BLD AUTO: 20.9 K/UL (ref 4.8–10.8)

## 2021-12-14 PROCEDURE — A9270 NON-COVERED ITEM OR SERVICE: HCPCS | Performed by: OBSTETRICS & GYNECOLOGY

## 2021-12-14 PROCEDURE — 700111 HCHG RX REV CODE 636 W/ 250 OVERRIDE (IP): Performed by: OBSTETRICS & GYNECOLOGY

## 2021-12-14 PROCEDURE — 85027 COMPLETE CBC AUTOMATED: CPT

## 2021-12-14 PROCEDURE — 700102 HCHG RX REV CODE 250 W/ 637 OVERRIDE(OP): Performed by: OBSTETRICS & GYNECOLOGY

## 2021-12-14 PROCEDURE — 36415 COLL VENOUS BLD VENIPUNCTURE: CPT

## 2021-12-14 PROCEDURE — 10907ZC DRAINAGE OF AMNIOTIC FLUID, THERAPEUTIC FROM PRODUCTS OF CONCEPTION, VIA NATURAL OR ARTIFICIAL OPENING: ICD-10-PCS | Performed by: OBSTETRICS & GYNECOLOGY

## 2021-12-14 PROCEDURE — 59409 OBSTETRICAL CARE: CPT

## 2021-12-14 PROCEDURE — 304965 HCHG RECOVERY SERVICES

## 2021-12-14 PROCEDURE — 85025 COMPLETE CBC W/AUTO DIFF WBC: CPT

## 2021-12-14 PROCEDURE — 770002 HCHG ROOM/CARE - OB PRIVATE (112)

## 2021-12-14 RX ORDER — MISOPROSTOL 200 UG/1
800 TABLET ORAL
Status: DISCONTINUED | OUTPATIENT
Start: 2021-12-14 | End: 2021-12-14 | Stop reason: HOSPADM

## 2021-12-14 RX ORDER — METHYLERGONOVINE MALEATE 0.2 MG/ML
0.2 INJECTION INTRAVENOUS
Status: DISCONTINUED | OUTPATIENT
Start: 2021-12-14 | End: 2021-12-14 | Stop reason: HOSPADM

## 2021-12-14 RX ORDER — BISACODYL 10 MG
10 SUPPOSITORY, RECTAL RECTAL PRN
Status: DISCONTINUED | OUTPATIENT
Start: 2021-12-14 | End: 2021-12-16 | Stop reason: HOSPADM

## 2021-12-14 RX ORDER — CARBOPROST TROMETHAMINE 250 UG/ML
250 INJECTION, SOLUTION INTRAMUSCULAR
Status: DISCONTINUED | OUTPATIENT
Start: 2021-12-14 | End: 2021-12-14 | Stop reason: HOSPADM

## 2021-12-14 RX ORDER — MISOPROSTOL 200 UG/1
TABLET ORAL
Status: ACTIVE
Start: 2021-12-14 | End: 2021-12-15

## 2021-12-14 RX ORDER — OXYCODONE AND ACETAMINOPHEN 10; 325 MG/1; MG/1
1 TABLET ORAL EVERY 4 HOURS PRN
Status: DISCONTINUED | OUTPATIENT
Start: 2021-12-14 | End: 2021-12-16 | Stop reason: HOSPADM

## 2021-12-14 RX ORDER — SODIUM CHLORIDE, SODIUM LACTATE, POTASSIUM CHLORIDE, CALCIUM CHLORIDE 600; 310; 30; 20 MG/100ML; MG/100ML; MG/100ML; MG/100ML
INJECTION, SOLUTION INTRAVENOUS CONTINUOUS
Status: ACTIVE | OUTPATIENT
Start: 2021-12-14 | End: 2021-12-14

## 2021-12-14 RX ORDER — DOCUSATE SODIUM 100 MG/1
100 CAPSULE, LIQUID FILLED ORAL 2 TIMES DAILY PRN
Status: DISCONTINUED | OUTPATIENT
Start: 2021-12-14 | End: 2021-12-16 | Stop reason: HOSPADM

## 2021-12-14 RX ORDER — OXYCODONE HYDROCHLORIDE AND ACETAMINOPHEN 5; 325 MG/1; MG/1
1 TABLET ORAL EVERY 4 HOURS PRN
Status: DISCONTINUED | OUTPATIENT
Start: 2021-12-14 | End: 2021-12-16 | Stop reason: HOSPADM

## 2021-12-14 RX ORDER — OXYTOCIN 10 [USP'U]/ML
10 INJECTION, SOLUTION INTRAMUSCULAR; INTRAVENOUS
Status: DISCONTINUED | OUTPATIENT
Start: 2021-12-14 | End: 2021-12-14 | Stop reason: HOSPADM

## 2021-12-14 RX ORDER — IBUPROFEN 600 MG/1
600 TABLET ORAL EVERY 6 HOURS PRN
Status: DISCONTINUED | OUTPATIENT
Start: 2021-12-14 | End: 2021-12-16 | Stop reason: HOSPADM

## 2021-12-14 RX ORDER — ACETAMINOPHEN 325 MG/1
325 TABLET ORAL EVERY 4 HOURS PRN
Status: DISCONTINUED | OUTPATIENT
Start: 2021-12-14 | End: 2021-12-16 | Stop reason: HOSPADM

## 2021-12-14 RX ORDER — VITAMIN A ACETATE, BETA CAROTENE, ASCORBIC ACID, CHOLECALCIFEROL, .ALPHA.-TOCOPHEROL ACETATE, DL-, THIAMINE MONONITRATE, RIBOFLAVIN, NIACINAMIDE, PYRIDOXINE HYDROCHLORIDE, FOLIC ACID, CYANOCOBALAMIN, CALCIUM CARBONATE, FERROUS FUMARATE, ZINC OXIDE, CUPRIC OXIDE 3080; 12; 120; 400; 1; 1.84; 3; 20; 22; 920; 25; 200; 27; 10; 2 [IU]/1; UG/1; MG/1; [IU]/1; MG/1; MG/1; MG/1; MG/1; MG/1; [IU]/1; MG/1; MG/1; MG/1; MG/1; MG/1
1 TABLET, FILM COATED ORAL
Status: DISCONTINUED | OUTPATIENT
Start: 2021-12-14 | End: 2021-12-16 | Stop reason: HOSPADM

## 2021-12-14 RX ORDER — SODIUM CHLORIDE, SODIUM LACTATE, POTASSIUM CHLORIDE, CALCIUM CHLORIDE 600; 310; 30; 20 MG/100ML; MG/100ML; MG/100ML; MG/100ML
INJECTION, SOLUTION INTRAVENOUS PRN
Status: DISCONTINUED | OUTPATIENT
Start: 2021-12-14 | End: 2021-12-16 | Stop reason: HOSPADM

## 2021-12-14 RX ORDER — MISOPROSTOL 200 UG/1
600 TABLET ORAL
Status: DISCONTINUED | OUTPATIENT
Start: 2021-12-14 | End: 2021-12-16 | Stop reason: HOSPADM

## 2021-12-14 RX ORDER — METHYLERGONOVINE MALEATE 0.2 MG/ML
0.2 INJECTION INTRAVENOUS
Status: DISCONTINUED | OUTPATIENT
Start: 2021-12-14 | End: 2021-12-16 | Stop reason: HOSPADM

## 2021-12-14 RX ORDER — CARBOPROST TROMETHAMINE 250 UG/ML
250 INJECTION, SOLUTION INTRAMUSCULAR
Status: DISCONTINUED | OUTPATIENT
Start: 2021-12-14 | End: 2021-12-16 | Stop reason: HOSPADM

## 2021-12-14 RX ADMIN — IBUPROFEN 600 MG: 600 TABLET ORAL at 16:29

## 2021-12-14 RX ADMIN — ACETAMINOPHEN 325 MG: 325 TABLET, FILM COATED ORAL at 19:55

## 2021-12-14 RX ADMIN — PRENATAL WITH FERROUS FUM AND FOLIC ACID 1 TABLET: 3080; 920; 120; 400; 22; 1.84; 3; 20; 10; 1; 12; 200; 27; 25; 2 TABLET ORAL at 19:55

## 2021-12-14 RX ADMIN — Medication 125 ML/HR: at 14:15

## 2021-12-14 RX ADMIN — Medication 1000 ML/HR: at 13:38

## 2021-12-14 ASSESSMENT — LIFESTYLE VARIABLES
AVERAGE NUMBER OF DAYS PER WEEK YOU HAVE A DRINK CONTAINING ALCOHOL: 0
HAVE PEOPLE ANNOYED YOU BY CRITICIZING YOUR DRINKING: NO
ALCOHOL_USE: NO
HOW MANY TIMES IN THE PAST YEAR HAVE YOU HAD 5 OR MORE DRINKS IN A DAY: 0
HAVE YOU EVER FELT YOU SHOULD CUT DOWN ON YOUR DRINKING: NO
CONSUMPTION TOTAL: NEGATIVE
TOTAL SCORE: 0
ON A TYPICAL DAY WHEN YOU DRINK ALCOHOL HOW MANY DRINKS DO YOU HAVE: 0
EVER FELT BAD OR GUILTY ABOUT YOUR DRINKING: NO
EVER HAD A DRINK FIRST THING IN THE MORNING TO STEADY YOUR NERVES TO GET RID OF A HANGOVER: NO
TOTAL SCORE: 0
TOTAL SCORE: 0
EVER_SMOKED: NEVER

## 2021-12-14 ASSESSMENT — PATIENT HEALTH QUESTIONNAIRE - PHQ9
2. FEELING DOWN, DEPRESSED, IRRITABLE, OR HOPELESS: NOT AT ALL
1. LITTLE INTEREST OR PLEASURE IN DOING THINGS: NOT AT ALL
SUM OF ALL RESPONSES TO PHQ9 QUESTIONS 1 AND 2: 0

## 2021-12-14 ASSESSMENT — COPD QUESTIONNAIRES
DURING THE PAST 4 WEEKS HOW MUCH DID YOU FEEL SHORT OF BREATH: NONE/LITTLE OF THE TIME
COPD SCREENING SCORE: 0
DO YOU EVER COUGH UP ANY MUCUS OR PHLEGM?: NO/ONLY WITH OCCASIONAL COLDS OR INFECTIONS
IN THE PAST 12 MONTHS DO YOU DO LESS THAN YOU USED TO BECAUSE OF YOUR BREATHING PROBLEMS: DISAGREE/UNSURE
HAVE YOU SMOKED AT LEAST 100 CIGARETTES IN YOUR ENTIRE LIFE: NO/DON'T KNOW

## 2021-12-14 ASSESSMENT — PAIN DESCRIPTION - PAIN TYPE
TYPE: ACUTE PAIN

## 2021-12-14 NOTE — PROGRESS NOTES
" @ 1333  Baby Girl \"Dannie\"  Apgars 8/9  Weight [id number]  No lacerations  EBL 200cc    Shanika Kothari MD  "

## 2021-12-14 NOTE — H&P
"Department of Obstetrics and Gynecology  Labor and Delivery History and Physical    Date of Admission: 2021     ID: 31 y.o.  with IUP at 36w5d, TESSY 2021    Primary OB: Saranya Blue MD    Attending OB: Shanika Kothari MD    CC: labor    HPI: Kristen Garg is a 31 y.o.  at 36w5d on date of admission who presents with active labor that started last night. She is sitting backwards on a wheelchair. She needs to push. No bleeding or LOF. She was 3cm in office when last checked.     ROS: 10 systems reviewed and negative except as noted above.    Obstetric History   . Denies hx of complications with delivery, PPH, or hx of HTN in pregnancy.   G1 - , 38w, male, , 5lb7oz  G2 - , 38w, male, , 6.14oz  G3 - Current pregnancy      Past Medical History  Surgical History   denies   denies      Gynecologic History  Social History   regular menses prior to pregnancy  Positive Hx of abnormal pap smears. LSIL in this pregnancy and needs colpo pp  denies Hx of STIs. Denies HSV Tobacco: denies  EtOH: denies  Street Drugs: denies      Medications  Allergies   No current facility-administered medications on file prior to encounter.     No current outpatient medications on file prior to encounter.    Patient has no known allergies.       O: Ht 1.6 m (5' 3\")   Wt 72.6 kg (160 lb)   BMI 28.34 kg/m²     Gen: NAD, AAO  Abd: Gravid, NTTP,Cephalic by [place], No rebound or guarding  Ext: NTTP, no  edema, 2+DPP  Pelvic: SVE complete/BBOW/Vertex. +1    FHT:  130, moderate variability. Terminal deceleration to 60 bpm. Pt on monitor 10 minutes prior to delivery  [employer]: CTX q 1-2min    Labs:   Hospital Outpatient Visit on 2021   Component Date Value Ref Range Status   • SARS-CoV-2 Source 2021 NP Swab   Final   • SARS-CoV-2 by PCR 2021 NotDetected   Final    Comment: PATIENTS: Important information regarding your results and instructions can  be found at " "[email or web address]   \"After your  Covid-19 Test\"    [facility] providers: PLEASE REFER TO DE-ESCALATION AND RETESTING PROTOCOL  on [email or web address]    **The Roche SARS-CoV-2 RT-PCR test has been made available for use under the  Emergency Use Authorization (EUA) only.         Prenatal labs:  B positive, ABS negative, RubImm, HBsAg, HIV NR, RPR NR, Criselda unknown.  1h glucose 171, 3h GTT normal.  GBS unknown.          A/P: Kristen Garg is a 31 y.o.  at 36w5d who presents with active  labor, GBS unknown, Glucose intolerance.  AVSS.  Cat I FHT.  *Admit to L&D  *IV, CBC, T&S on hold  *Labor:  within 10 minutes of arrival to the hospital    *FWB: Reassuring, reactive, Cat II  *Pain: no time for pain management  *Global: Rh positive, RubImm, GBS unknown.    - Chica Kothari M.D.,  2021, 1:54 PM   "

## 2021-12-15 PROCEDURE — A9270 NON-COVERED ITEM OR SERVICE: HCPCS | Performed by: OBSTETRICS & GYNECOLOGY

## 2021-12-15 PROCEDURE — 770002 HCHG ROOM/CARE - OB PRIVATE (112)

## 2021-12-15 PROCEDURE — 700102 HCHG RX REV CODE 250 W/ 637 OVERRIDE(OP): Performed by: OBSTETRICS & GYNECOLOGY

## 2021-12-15 RX ADMIN — IBUPROFEN 600 MG: 600 TABLET ORAL at 14:49

## 2021-12-15 RX ADMIN — IBUPROFEN 600 MG: 600 TABLET ORAL at 08:14

## 2021-12-15 RX ADMIN — IBUPROFEN 600 MG: 600 TABLET ORAL at 20:27

## 2021-12-15 RX ADMIN — PRENATAL WITH FERROUS FUM AND FOLIC ACID 1 TABLET: 3080; 920; 120; 400; 22; 1.84; 3; 20; 10; 1; 12; 200; 27; 25; 2 TABLET ORAL at 08:13

## 2021-12-15 RX ADMIN — ACETAMINOPHEN 325 MG: 325 TABLET, FILM COATED ORAL at 12:08

## 2021-12-15 RX ADMIN — IBUPROFEN 600 MG: 600 TABLET ORAL at 01:57

## 2021-12-15 RX ADMIN — ACETAMINOPHEN 325 MG: 325 TABLET, FILM COATED ORAL at 05:37

## 2021-12-15 RX ADMIN — ACETAMINOPHEN 325 MG: 325 TABLET, FILM COATED ORAL at 20:25

## 2021-12-15 ASSESSMENT — EDINBURGH POSTNATAL DEPRESSION SCALE (EPDS)
THE THOUGHT OF HARMING MYSELF HAS OCCURRED TO ME: NEVER
I HAVE BEEN SO UNHAPPY THAT I HAVE HAD DIFFICULTY SLEEPING: NOT AT ALL
I HAVE BLAMED MYSELF UNNECESSARILY WHEN THINGS WENT WRONG: NO, NEVER
I HAVE LOOKED FORWARD WITH ENJOYMENT TO THINGS: AS MUCH AS I EVER DID
THINGS HAVE BEEN GETTING ON TOP OF ME: NO, I HAVE BEEN COPING AS WELL AS EVER
I HAVE FELT SCARED OR PANICKY FOR NO GOOD REASON: NO, NOT AT ALL
I HAVE BEEN SO UNHAPPY THAT I HAVE BEEN CRYING: NO, NEVER
I HAVE BEEN ABLE TO LAUGH AND SEE THE FUNNY SIDE OF THINGS: AS MUCH AS I ALWAYS COULD
I HAVE FELT SAD OR MISERABLE: NO, NOT AT ALL
I HAVE BEEN ANXIOUS OR WORRIED FOR NO GOOD REASON: NO, NOT AT ALL

## 2021-12-15 ASSESSMENT — PAIN DESCRIPTION - PAIN TYPE
TYPE: ACUTE PAIN

## 2021-12-15 NOTE — PROGRESS NOTES
Pt arrived via W/C  from L&D to room s335, reviewed unit standards and protocols, advised pt to call upon first void.  ID bands verified with labor RN on mother, baby and FOB. Cuddles active and tag number verified. Call light within reach. Educated mother on values of skin to skin and attempting to breast feed as she expressed she would only like to pump and re-feed. Discussed that donor milk is for breast feeding mothers. Mother stated formula was fine. States she doesn't lactate quickly and really wants to pump as this is baby number 3. Given pump parts and formula. Feeding sheet for appropriate amounts as well. Education on  and blood sugars discussed with mother and FOB.

## 2021-12-15 NOTE — PROGRESS NOTES
Assessment completed. Fundus firm at umbilicus, lochia light rubra. Pt ambulating independently in room.  Discussed plan of care for patient and infant. Educated parents on car seat challenge for infant.  Patient states she ordered a car seat and shipping was delayed, but they will have one available tomorrow. Patient verbalized understanding and in agreement.  Will continue to monitor.

## 2021-12-15 NOTE — LACTATION NOTE
This note was copied from a baby's chart.  Moms choice to not breastfeed and to use breast pump and bottle feed expressed/pumped breastmilk.  Supplemental guidelines provided and reviewed.  MOB denies needing any assistance and denies having any questions.  Has fed all other children in this same manner and denies wanting assistance at this time.  Plan to use breast pump and bottle feed for 6 months.  Will use formula supplementation per supplemental guidelines until milk supply is in.

## 2021-12-15 NOTE — PROGRESS NOTES
1324-Pt here from home with urge to push and contractions. Pt in extreme pain. SVE complete. Dr Working at bedside. Monitors applied. Room prepped for delivery. IV started. Labs drawn.  1335- baby Girl by Dr Kothari. APGARS 8/9.  1337-placenta delivered intact. No tears noted. Mel care provided. Pads changed. Mother resting. FOB bonding with infant.   1530-Pt up to bathroom, pt voided. Mel care provided.   1630-Pt to wheelchair. Pt and infant taken to PP [id number]-Hwovrb given to Dania INIGUEZ

## 2021-12-15 NOTE — CARE PLAN
The patient is Stable - Low risk of patient condition declining or worsening         Progress made toward(s) clinical / shift goals:  3rd baby, up and voiding, pain controlled with medication as prescribed by MD.    Patient is not progressing towards the following goals:

## 2021-12-15 NOTE — L&D DELIVERY NOTE
DATE OF SERVICE:  2021     PREOPERATIVE DIAGNOSES:       1.  A 31-year-old  3, para 2 at 36 weeks and 5 days.  2.  Active  labor.  3.  Glucose intolerance.  4.  GBS unknown.     PROCEDURE:  Normal spontaneous vaginal delivery.     POSTOPERATIVE DIAGNOSES:       1.  A 31-year-old  3, para 2 at 36 weeks and 5 days.  2.  Active  labor.  3.  Glucose intolerance  4.  GBS unknown.     PRIMARY OBSTETRICIAN:  Dr. Blue.     DELIVERING OBSTETRICIAN:  Suly Kothari MD.     HOSPITAL COURSE:  The patient is a 31-year-old  3, para 2-0-0-2, who I   met in the elevator on the way to Labor and Delivery at 36 weeks and 5 days.    We got her into labor room and within a few minutes she had a delivery.  At   the time of presentation, she was complete with a bulging bag, IV was placed.    Amniotomy was performed it revealed clear fluid and she pushed well with two   contractions to deliver baby girl OA over an intact perineum.  Anterior and   posterior shoulders delivered without difficulty and baby was placed on   maternal stomach where she was immediately vigorous.  Cord was allowed to   pulsate for 30 seconds, at which point it was clamped and cut.  Pitocin was   initiated.  Placenta delivered easily and intact under gentle manual traction.    Her fundus was firm and midline.  She had no pelvic lacerations.  The   patient was on the monitor for approximately 10 minutes prior to delivery,   during which time she had a baseline of 130s with moderate long-term   variability.  She did have a terminal deceleration with pushing.     FINDINGS:       1.  Baby girl at 1335, named Dannie, Apgars 8 and 9, weight 4 pounds 14   ounces.  2.  Normal placenta with 3-vessel cord.  3.  Clear amniotic fluid.     COUNTS:  Correct.     COMPLICATIONS:  None apparent.     ESTIMATED BLOOD LOSS:  200 mL     DISPOSITION:  Stable for routine postpartum care.        ______________________________  SULY KOTHARI,  MD BELLO/MABEL    DD:  12/14/2021 14:09  DT:  12/14/2021 17:51    Job#:  [id number]

## 2021-12-15 NOTE — PROGRESS NOTES
"PPD#1    S: Pain controlled but notes cramping. Lochia normal. No concerns.     O: /78   Pulse 88   Temp 36.9 °C (98.4 °F) (Temporal)   Resp 18   Ht 1.6 m (5' 3\")   Wt 72.6 kg (160 lb)   SpO2 98%   Gen: NAD  Fundus firm below umbilucus  Ext: no c/c/e    Labs: RH pos, RI, GBS unknown  Recent Labs     21  1340 21  2250   WBC 20.9* 19.7*   RBC 4.25 3.60*   HEMOGLOBIN 13.3 11.2*   HEMATOCRIT 40.0 33.2*   MCV 94.1 92.2   MCH 31.3 31.1   RDW 45.1 43.8   PLATELETCT 414 347   MPV 9.4 9.2   NEUTSPOLYS 84.00*  --    LYMPHOCYTES 9.20*  --    MONOCYTES 4.60  --    EOSINOPHILS 0.50  --    BASOPHILS 0.70  --      A/P 30yo  s/p  at 36w5d  1. Routine post partum care  2. Anemia due to acute blood loss. Asymptomatic  3. Home tomorrow due to GA, baby [id number], and GBS unknown  "

## 2021-12-15 NOTE — CARE PLAN
The patient is Stable - Low risk of patient condition declining or worsening    Shift Goals  Clinical Goals: Maintain tolerable pain level  Patient Goals: Rest  Family Goals: Bond    Progress made toward(s) clinical / shift goals:    Problem: Pain - Standard  Goal: Alleviation of pain or a reduction in pain to the patient’s comfort goal  Outcome: Progressing  Note: Pain controlled with motrin and tylenol.     Problem: Psychosocial - Postpartum  Goal: Patient will verbalize and demonstrate effective bonding and parenting behavior  Outcome: Progressing       Patient is not progressing towards the following goals: n/a

## 2021-12-16 VITALS
RESPIRATION RATE: 18 BRPM | TEMPERATURE: 98.5 F | HEART RATE: 82 BPM | WEIGHT: 160 LBS | DIASTOLIC BLOOD PRESSURE: 82 MMHG | SYSTOLIC BLOOD PRESSURE: 135 MMHG | OXYGEN SATURATION: 98 % | BODY MASS INDEX: 28.35 KG/M2 | HEIGHT: 63 IN

## 2021-12-16 PROCEDURE — 700102 HCHG RX REV CODE 250 W/ 637 OVERRIDE(OP): Performed by: OBSTETRICS & GYNECOLOGY

## 2021-12-16 PROCEDURE — A9270 NON-COVERED ITEM OR SERVICE: HCPCS | Performed by: OBSTETRICS & GYNECOLOGY

## 2021-12-16 RX ADMIN — ACETAMINOPHEN 325 MG: 325 TABLET, FILM COATED ORAL at 00:58

## 2021-12-16 RX ADMIN — IBUPROFEN 600 MG: 600 TABLET ORAL at 06:43

## 2021-12-16 RX ADMIN — PRENATAL WITH FERROUS FUM AND FOLIC ACID 1 TABLET: 3080; 920; 120; 400; 22; 1.84; 3; 20; 10; 1; 12; 200; 27; 25; 2 TABLET ORAL at 07:43

## 2021-12-16 ASSESSMENT — PAIN DESCRIPTION - PAIN TYPE
TYPE: ACUTE PAIN

## 2021-12-16 NOTE — PROGRESS NOTES
0830: Assessment completed, fundus firm, lochia scant. Plan of care reviewed. Denies pain at this time, will call if intervention needed.

## 2021-12-16 NOTE — DISCHARGE SUMMARY
Discharge Summary:      Kristen Garg    Admit Date:   2021  Discharge Date:  2021     Admitting diagnosis:  Indication for care in labor or delivery [O75.9]  Labor and delivery indication for care or intervention [O75.9]  Discharge Diagnosis: Status post vaginal, spontaneous.  Pregnancy Complications: group B strep (untreated)  Tubal Ligation:  no        History:  No past medical history on file.  OB History    Para Term  AB Living   3 3 2 1   3   SAB IAB Ectopic Molar Multiple Live Births           0 3      # Outcome Date GA Lbr Jose/2nd Weight Sex Delivery Anes PTL Lv   3  21 36w5d 16:23 / 00:12 2.22 kg (4 lb 14.3 oz) F Vag-Spont None N TRICE   2 Term 19 38w3d / 00:23 3.12 kg (6 lb 14.1 oz) M Vag-Spont EPI N TRICE   1 Term  37w0d     EPI  TRICE        Patient has no known allergies.  Patient Active Problem List    Diagnosis Date Noted   • Indication for care in labor or delivery 2021   • Labor and delivery indication for care or intervention 2021        Hospital Course:   31 y.o. , now para 3, was admitted with the above mentioned diagnosis, underwent Active Labor, vaginal, spontaneous. Patient postpartum course was unremarkable, with progressive advancement in diet , ambulation and toleration of oral analgesia. Patient without complaints today and desires discharge.      Vitals:    12/15/21 0600 12/15/21 1000 12/15/21 1800 21 0641   BP: 125/78 121/76 124/78 135/82   Pulse: 88 84 78 82   Resp: 18 18 18 18   Temp: 36.9 °C (98.4 °F) 36.6 °C (97.9 °F) 36.7 °C (98.1 °F) 36.9 °C (98.5 °F)   TempSrc: Temporal Temporal Temporal Temporal   SpO2: 98% 98% 98% 98%   Weight:       Height:           Current Facility-Administered Medications   Medication Dose   • oxytocin (PITOCIN) infusion (for postpartum)   mL/hr   • ibuprofen (MOTRIN) tablet 600 mg  600 mg   • acetaminophen (Tylenol) tablet 325 mg  325 mg   • oxyCODONE-acetaminophen (PERCOCET)  5-325 MG per tablet 1 Tablet  1 Tablet   • oxyCODONE-acetaminophen (PERCOCET-10)  MG per tablet 1 Tablet  1 Tablet   • LR infusion     • tetanus-dipth-acell pertussis (Tdap) inj 0.5 mL  0.5 mL   • measles, mumps and rubella vaccine (MMR) injection 0.5 mL  0.5 mL   • PRN oxytocin (PITOCIN) (20 Units/1000 mL) PRN for excessive uterine bleeding - See Admin Instr  125-999 mL/hr   • miSOPROStol (CYTOTEC) tablet 600 mcg  600 mcg   • methylergonovine (METHERGINE) injection 0.2 mg  0.2 mg   • carboPROST (HEMABATE) injection 250 mcg  250 mcg   • docusate sodium (COLACE) capsule 100 mg  100 mg   • bisacodyl (DULCOLAX) suppository 10 mg  10 mg   • magnesium hydroxide (MILK OF MAGNESIA) suspension 30 mL  30 mL   • prenatal plus vitamin (STUARTNATAL 1+1) 27-1 MG tablet 1 Tablet  1 Tablet       Exam:  Breast Exam: Inspection negative. No nipple discharge or bleeding. No masses or nodularity palpable  Abdomen: Abdomen soft, non-tender. BS normal. No masses,  No organomegaly  Fundus Non Tender: yes  Incision: none  Perineum: perineum intact  Extremity: extremities, peripheral pulses and reflexes normal     Labs:  Recent Labs     12/14/21  1340 12/14/21  2250   WBC 20.9* 19.7*   RBC 4.25 3.60*   HEMOGLOBIN 13.3 11.2*   HEMATOCRIT 40.0 33.2*   MCV 94.1 92.2   MCH 31.3 31.1   MCHC 33.3* 33.7   RDW 45.1 43.8   PLATELETCT 414 347   MPV 9.4 9.2        Activity:   Discharge to home  Pelvic Rest x 6 weeks    Assessment:  normal postpartum course  Discharge Assessment: Other; meeting all discharge criteria     Follow up: . 6w for pp check with Dr. Blue, call sooner if needs anything     Discharge Meds:   No current outpatient medications on file.       Saranya Blue M.D.

## 2021-12-16 NOTE — PROGRESS NOTES
Assessment completed.  Fundus firm, lochia WNL. Patient states pain is controlled with motrin and tylenol. Reviewed plan of care and addressed all questions/concerns.  Patient condition will continue to be monitored.

## 2021-12-16 NOTE — CARE PLAN
The patient is Stable - Low risk of patient condition declining or worsening    Shift Goals  Clinical Goals: Maintain stable vitals   Patient Goals: Rest  Family Goals: Bond    Progress made toward(s) clinical / shift goals:    Problem: Knowledge Deficit - L&D  Goal: Patient and family/caregivers will demonstrate understanding of plan of care, disease process/condition, diagnostic tests and medications  Outcome: Progressing     Problem: Pain - Standard  Goal: Alleviation of pain or a reduction in pain to the patient’s comfort goal  Outcome: Progressing     Problem: Psychosocial - Postpartum  Goal: Patient will verbalize and demonstrate effective bonding and parenting behavior  Outcome: Progressing       Patient is not progressing towards the following goals:

## 2021-12-16 NOTE — CARE PLAN
The patient is Stable - Low risk of patient condition declining or worsening    Shift Goals  Clinical Goals: Maintain stable vitals and tolerable pain level  Patient Goals: Rest  Family Goals: Bond    Progress made toward(s) clinical / shift goals:    Problem: Knowledge Deficit - Standard  Goal: Patient and family/care givers will demonstrate understanding of plan of care, disease process/condition, diagnostic tests and medications  Outcome: Progressing     Problem: Pain - Standard  Goal: Alleviation of pain or a reduction in pain to the patient’s comfort goal  Outcome: Progressing  Note: Pain controlled with motrin and tylenol.      Problem: Psychosocial - Postpartum  Goal: Patient will verbalize and demonstrate effective bonding and parenting behavior  Outcome: Progressing  Note: Positive attachment with infant.      Problem: Infection - Postpartum  Goal: Postpartum patient will be free of signs and symptoms of infection  Outcome: Progressing  Note: VSS.  Pt afebrile.        Patient is not progressing towards the following goals: n/a

## 2021-12-16 NOTE — CARE PLAN
The patient is Stable - Low risk of patient condition declining or worsening    Shift Goals  Clinical Goals: Maintain stable vitals  Patient Goals: Rest  Family Goals: Bond    Progress made toward(s) clinical / shift goals:    Problem: Psychosocial - Postpartum  Goal: Patient will verbalize and demonstrate effective bonding and parenting behavior  Note: Patient demonstrating adequate bonding behavior towards infant     Problem: Infection - Postpartum  Goal: Postpartum patient will be free of signs and symptoms of infection  Note: Patient not exhibiting symptoms of infection

## 2021-12-16 NOTE — DISCHARGE INSTRUCTIONS
PATIENT DISCHARGE EDUCATION INSTRUCTION SHEET  REASONS TO CALL YOUR OBSTETRICIAN  · Persistent fever, shaking, chills (Temperature higher than 100.4) may indicate you have an infection  · Heavy bleeding: soaking more than 1 pad per hour; Passing clots an egg-sized clot or bigger may mean you have an postpartum hemorrhage  · Foul odor from vagina or bad smelling or discolored discharge or blood  · Breast infection (Mastitis symptoms); breast pain, chills, fever, redness or red streaks, may feel flu like symptoms  · Urinary pain, burning or frequency  · Incision that is not healing, increased redness, swelling, tenderness or pain, or any pus from episiotomy or  site may mean you have an infection  · Redness, swelling, warmth, or painful to touch in the calf area of your leg may mean you have a blood clot  · Severe or intensified depression, thoughts or feelings of wanting to hurt yourself or someone else   · Pain in chest, obstructed breathing or shortness of breath (trouble catching your breath) may mean you are having a postpartum complication. Call your provider immediately   · Headache that does not get better, even after taking medicine, a bad headache with vision changes or pain in the upper right area of your belly may mean you have high blood pressure or post birth preeclampsia. Call your provider immediately    HAND WASHING  All family and friends should wash their hands:  · Before and after holding the baby  · Before feeding the baby  · After using the restroom or changing the baby's diaper    WOUND CARE  Ask your physician for additional care instructions. In general:  ·  Incision:  · May shower and pat incision dry   · Keep the incision clean and dry  · There should not be any opening or pus from the incision  · Continue to walk at home 3 times a day   · Do NOT lift anything heavier than your baby (over 10 pounds)  · Encourage family to help participate in care of the  to allow  rest and mom time to heal  · Episiotomy/Laceration  · May use vance-spray bottle, witch hazel pads and dermaplast spray for comfort  · Use vance-spray bottle after urinating to cleanse perineal area  · To prevent burning during urination spray vance-water bottle on labial area   · Pat perineal area dry until episiotomy/laceration is healed  · Continue to use vance-bottle until bleeding stops as needed  · If have a 2nd degree laceration or greater, a Sitz bath can offer relief from soreness, burning, and inflammation   · Sitz Bath   · Sit in 6 inches of warm water and soak laceration as needed until the laceration heals    VAGINAL CARE AND BLEEDING  · Nothing inside vagina for 6 weeks:   · No sexual intercourse, tampons or douching  · Bleeding may continue for 2-4 weeks. Amount and color may vary  · Soaking 1 pad or more in an hour for several hours is considered heavy bleeding  · Passing large egg sized blood clots can be concerning  · If you feel like you have heavy bleeding or are having increasing amount of blood clots call your Obstetrician immediately  · If you begin feeling faint upon standing, feeling sick to your stomach, have clammy skin, a really fast heartbeat, have chills, start feeling confused, dizzy, sleepy or weak, or feeling like you're going to faint call your Obstetrician immediately    HYPERTENSION   Preeclampsia or gestational hypertension are types of high blood pressure that only pregnant women can get. It is important for you to be aware of symptoms to seek early intervention and treatment. If you have any of these symptoms immediately call your Obstetrician    · Vision changes or blurred vision   · Severe headache or pain that is unrelieved with medication and will not go away  · Persistent pain in upper abdomen or shoulder   · Increased swelling of face, feet, or hands  · Difficulty breathing or shortness of breath at rest  · Urinating less than usual    URINATION AND BOWEL MOVEMENTS  · Eating  "more fiber (bran cereal, fruits, and vegetables) and drinking plenty of fluids will help to avoid constipation  · Urinary frequency and urgency after childbirth is normal  · If you experience any urinary pain, burning or frequency call your provider    BIRTH CONTROL  · It is possible to become pregnant at any time after delivery and while breastfeeding  · Plan to discuss a method of birth control with your physician at your post delivery follow up visit    POSTPARTUM BLUES  During the first few days after birth, you may experience a sense of the \"blues\" which may include impatience, irritability or even crying. These feelings come and go quickly. However, as many as 1 in 10 women experience emotional symptoms known as postpartum depression.     POSTPARTUM DEPRESSION    May start as early as the second or third day after delivery or take several weeks or months to develop. Symptoms of \"blues\" are present, but are more intense: Crying spells; loss of appetite; feelings of hopelessness or loss of control; fear of touching the baby; over concern or no concern at all about the baby; little or no concern about your own appearance/caring for yourself; and/or inability to sleep or excessive sleeping. Contact your Obstetrician if you are experiencing any of these symptoms     PREVENTING SHAKEN BABY  If you are angry or stressed, PUT THE BABY IN THE CRIB, step away, take some deep breaths, and wait until you are calm to care for the baby. DO NOT SHAKE THE BABY. You are not alone, call a supporter for help.  · [facility] 24/7 crisis call line (440-583-2924) or (1-914.475.4176)  · You can also text them, text \"ANSWER\" (749572)      "

## 2021-12-16 NOTE — PROGRESS NOTES
Assessment complete. Pt ambulating independently, able to make needs known. VSS. Fundus firm, lochia light. Pain controlled. All needs met at this time.

## 2021-12-16 NOTE — PROGRESS NOTES
Pt dc'd to home. IV's removed. Pt left unit via walking with escort. Personal belongings with pt when leaving unit. Pt given discharge instructions prior to leaving unit including prescriptions and when to visit with physician; verbalizes understanding. Copy of discharge instructions with pt and in the chart.